# Patient Record
(demographics unavailable — no encounter records)

---

## 2024-11-07 NOTE — PHYSICAL EXAM
[Well Developed] : well developed [Well Nourished] : well nourished [No Acute Distress] : no acute distress [Normal Conjunctiva] : normal conjunctiva [Normal Venous Pressure] : normal venous pressure [No Carotid Bruit] : no carotid bruit [Normal S1, S2] : normal S1, S2 [No Murmur] : no murmur [No Rub] : no rub [No Gallop] : no gallop [Clear Lung Fields] : clear lung fields [Good Air Entry] : good air entry [No Respiratory Distress] : no respiratory distress  [Soft] : abdomen soft [Non Tender] : non-tender [No Masses/organomegaly] : no masses/organomegaly [Normal Bowel Sounds] : normal bowel sounds [Normal Gait] : normal gait [No Edema] : no edema [No Cyanosis] : no cyanosis [No Clubbing] : no clubbing [No Varicosities] : no varicosities [No Rash] : no rash [No Skin Lesions] : no skin lesions [Moves all extremities] : moves all extremities [No Focal Deficits] : no focal deficits [Normal Speech] : normal speech [Alert and Oriented] : alert and oriented [Normal memory] : normal memory [de-identified] : Regular rate and rhythm, NL S1, S2, non-displaced PMI, chest non-tender; no rubs,heaves  or gallops a  Grade 2/6 systolic murmur noted at the LSB

## 2024-11-07 NOTE — HISTORY OF PRESENT ILLNESS
[FreeTextEntry1] : ELVA JACOBSEN  is a 53 year old F w/ pmhx of HTN, Hiatal hernia, Depression on Truvada, Morbid Obesity, Asthma who presents today as a new patient and pre op clearance. The patient denies fever, chills, sore throat, loss of taste or smell, muscle aches weight loss, malaise, rash, recent travel, insect bites, alteration bowel habits, headaches, weakness, abdominal  pain, bloating, changes in urination, visual disturbances, shortness of breath, chest pain, dizziness, palpitations.  The patient here for evaluation of high blood pressure. Patient is currently tolerating the current antihypertensive regime and they deny headaches, stiff neck, visual changes, or PND. The patient has been trying to stay on a low-sodium diet.  I was asked to see this delightful patient today for Pre-op Evaluation  prior to  _laparoscopic sleeve gastrectomy____  with   Dr. Benítez____  at fax number   _______  .  The patient denies fever, cough, wheezing, sputum production, hemoptysis, dyspnea, congestion, diarrhea, constipation, nausea, vomiting, bright red blood per rectum, melena, angina, chest pain, palpitations, diaphoresis, PND, incontinence, frequency, urgency, dysuria, edema, joint pain, headache, weakness, numbness and dizziness. The date of the planned procedure is: ___TBD________

## 2024-11-07 NOTE — PHYSICAL EXAM
[Well Developed] : well developed [Well Nourished] : well nourished [No Acute Distress] : no acute distress [Normal Conjunctiva] : normal conjunctiva [Normal Venous Pressure] : normal venous pressure [No Carotid Bruit] : no carotid bruit [Normal S1, S2] : normal S1, S2 [No Murmur] : no murmur [No Rub] : no rub [No Gallop] : no gallop [Clear Lung Fields] : clear lung fields [Good Air Entry] : good air entry [No Respiratory Distress] : no respiratory distress  [Soft] : abdomen soft [Non Tender] : non-tender [No Masses/organomegaly] : no masses/organomegaly [Normal Bowel Sounds] : normal bowel sounds [Normal Gait] : normal gait [No Edema] : no edema [No Cyanosis] : no cyanosis [No Clubbing] : no clubbing [No Varicosities] : no varicosities [No Rash] : no rash [No Skin Lesions] : no skin lesions [Moves all extremities] : moves all extremities [No Focal Deficits] : no focal deficits [Normal Speech] : normal speech [Alert and Oriented] : alert and oriented [Normal memory] : normal memory [de-identified] : Regular rate and rhythm, NL S1, S2, non-displaced PMI, chest non-tender; no rubs,heaves  or gallops a  Grade 2/6 systolic murmur noted at the LSB

## 2024-11-14 NOTE — HISTORY OF PRESENT ILLNESS
[de-identified] : 53-year-old female with a longstanding history of morbid obesity is currently undergoing work-up for Laparoscopic Sleeve Gastrectomy. Weight stable. Patient is trying to make better food choices, eat 3 protein-rich meals/day, drink more water, and ambulate for exercise.

## 2024-11-14 NOTE — PHYSICAL EXAM
[Obese, well nourished, in no acute distress] : obese, well nourished, in no acute distress [Normal] : full range of motion and no deformities appreciated [de-identified] : Equal chest rise, non-labored respirations, no audible wheezing. [de-identified] : soft, NT, ND, no evidence of hernia or diastasis; well-healed scars from prior  and hysterectomy; obese

## 2024-11-14 NOTE — HISTORY OF PRESENT ILLNESS
[de-identified] : 53-year-old female with a longstanding history of morbid obesity is currently undergoing work-up for Laparoscopic Sleeve Gastrectomy. Weight stable. Patient is trying to make better food choices, eat 3 protein-rich meals/day, drink more water, and ambulate for exercise.

## 2024-11-14 NOTE — PHYSICAL EXAM
[Obese, well nourished, in no acute distress] : obese, well nourished, in no acute distress [Normal] : full range of motion and no deformities appreciated [de-identified] : Equal chest rise, non-labored respirations, no audible wheezing. [de-identified] : soft, NT, ND, no evidence of hernia or diastasis; well-healed scars from prior  and hysterectomy; obese

## 2024-11-14 NOTE — ASSESSMENT
[FreeTextEntry1] : 53-year-old female with a longstanding history of morbid obesity is currently undergoing work-up for Laparoscopic Sleeve Gastrectomy. The patient is doing well. Weight stable. Greater than 15 minutes was spent discussing the importance of dietary and lifestyle changes for weight loss as well as long-term weight maintenance.  Reviewed nutrition and exercise guidelines Protein focus diet and 3 meals/day Increase daily zero calorie liquid intake - goal 64oz/day Increase activities and keep track of steps - goal 8-10k steps/day Stress reduction modalities   Continue workup for Laparoscopic Sleeve Gastrectomy Continue multicomponent intervention visits  Labs pending Cardiology: undergoing workup Pulmonary: cleared by Dr Rajan 10/2/24. HST: mild desmond. Pt reports using CPAP. PFT: restricted, low volumes, dlco normal when corrected for volumes. CXR: clear lungs, no focal consolidation or pleural effusions, cardiac silhouette appears normal. No bony abnormality.  Endoscopy: EGD showed moderate hiatal hernia; non erosive gastritis, patulous LES, ring in the lower third of the esophagus; will order upper GI series. Negative H Pylori. No intestinal epithelium identified. Nutritionist Ingrid Aguirre evaluation completed 7/16/24 Dr. KONSTANTIN Weaver Psychologist evaluation - conditional candidate pending documentation from psychiatrist  LOS pending Diet history completed Pt follows with rheumatologist for arthritis - will need medication plan for post surgery (currently on diclofenac and prednisone)  All questions answered Additional time spent before and after visit reviewing chart.

## 2024-11-20 NOTE — PLAN
[TextEntry] : To follow-up with bariatric surgeon. She will see me 6 months after her bariatric surgery.

## 2024-11-20 NOTE — ASSESSMENT
[FreeTextEntry1] : Patient is being evaluated for sleeve gastrectomy.  An upper endoscopy revealed a hiatus hernia and patulous LES.  She also had a nonobstructing Schatzki's ring.  Pathology was negative for H. pylori and revealed mild chronic gastritis.  She will continue omeprazole. She apparently had an upper GI series this morning.  The results are pending.  She will follow-up with her bariatric surgeon.

## 2024-11-20 NOTE — HISTORY OF PRESENT ILLNESS
[FreeTextEntry1] : Patient is a 52-year-old female who is referred for an upper endoscopy prior to bariatric sleeve gastrectomy.  She does have a history of chronic heartburn.  She had an upper endoscopy in 2020 that revealed a hiatus hernia with erosive esophagitis.  She is taking omeprazole 40 mg daily with relief of her symptoms.  She also was found to have a smooth stricture at that time.  This did not require dilatation.  She has no complaints of dysphagia.  She does have heartburn and regurgitation if she does not take her omeprazole. Patient also had a colonoscopy in 2020 that was normal.  Her bowel movements are regular.  11/20/2024-patient had an upper endoscopy on 8/15/2024.  She did have a medium sized hiatus hernia and a patulous LES.  There was a Schatzki's ring in the lower esophagus at about 35 cm.  This was nonobstructing and the scope was easily passed.  The rest of the endoscopy was normal.  Pathology revealed mild chronic gastritis but H. pylori was negative.  Biopsies at the GE junction did not reveal any evidence of intestinal metaplasia or Good's esophagus.  Biopsies of the esophagus revealed mild chronic inflammation.  There is no evidence of eosinophilic esophagitis. Patient has started a proton pump inhibitor and symptomatically is doing much better.

## 2024-11-20 NOTE — REASON FOR VISIT
[Follow-up] : a follow-up of an existing diagnosis [FreeTextEntry1] : s/p EGD, For possible bariatric surgery

## 2025-01-29 NOTE — HISTORY OF PRESENT ILLNESS
[FreeTextEntry1] : This is a 53 year y/o female with a PMHx of HTN, Hiatal hernia, ROSA with CPAP, Depression on Truvada, Morbid Obesity, Asthma presents today for follow up.   BMI 44.59, pt is planning for laparoscopic sleeve gastrectomy  The patient is here for evaluation of high blood pressure. Currently tolerating antihypertensive medications. Denies headaches, stiff neck, visual changes, or PND. The patient has been trying to stay on a low-sodium diet.  s/p negative STR 11/2024 s/p TTE 11/2024 - normal EF, mild MR, mild TR.  I was asked to see this delightful patient today for Pre-op Evaluation  prior to  Laparoscopic Sleeve Gastrectomy_____  with   Dr. Kell Hassan .  The patient denies fever, cough, wheezing, sputum production, hemoptysis, dyspnea, congestion, diarrhea, constipation, nausea, vomiting, bright red blood per rectum, melena, angina, chest pain, palpitations, diaphoresis, PND, incontinence, frequency, urgency, dysuria, edema, joint pain, headache, weakness, numbness and dizziness. The date of the planned procedure is: ___TBD________  Patient expresses frustration on not being able to lose weight despite dietary attempts which she needs to complete her process of clearing for gastric bypass surgery.

## 2025-01-29 NOTE — HISTORY OF PRESENT ILLNESS
Do you know anything about this? Please advise   Thanks [FreeTextEntry1] : This is a 53 year y/o female with a PMHx of HTN, Hiatal hernia, ROSA with CPAP, Depression on Truvada, Morbid Obesity, Asthma presents today for follow up.   BMI 44.59, pt is planning for laparoscopic sleeve gastrectomy  The patient is here for evaluation of high blood pressure. Currently tolerating antihypertensive medications. Denies headaches, stiff neck, visual changes, or PND. The patient has been trying to stay on a low-sodium diet.  s/p negative STR 11/2024 s/p TTE 11/2024 - normal EF, mild MR, mild TR.  I was asked to see this delightful patient today for Pre-op Evaluation  prior to  Laparoscopic Sleeve Gastrectomy_____  with   Dr. Kell Hassan .  The patient denies fever, cough, wheezing, sputum production, hemoptysis, dyspnea, congestion, diarrhea, constipation, nausea, vomiting, bright red blood per rectum, melena, angina, chest pain, palpitations, diaphoresis, PND, incontinence, frequency, urgency, dysuria, edema, joint pain, headache, weakness, numbness and dizziness. The date of the planned procedure is: ___TBD________  Patient expresses frustration on not being able to lose weight despite dietary attempts which she needs to complete her process of clearing for gastric bypass surgery.

## 2025-01-29 NOTE — DISCUSSION/SUMMARY
[FreeTextEntry1] : My cumulative time spent on today's visit included: Preparation for the visit, review of the medical records, review of pertinent diagnostic studies, review and integration of laboratory data, coordination of care, examination and counseling of the patient on the above diagnosis, treatment plan and prognosis, orders of diagnostic tests and any additional lab data ordered today, medications and/or appropriate procedures and documentation in the medical records of today's visit. Patient advised and verbally acknowledged to make a follow up appt 3-4 weeks and to call the office immediately for follow up for any change in their condition. pt. that failure to comply with follow-up and/or notification of office for change in the condition can result in severe health consequences and worsening of her overall condition. My plan today was given to the patient in writing with all instructions discussed verbally above.

## 2025-03-12 NOTE — PROCEDURE
[FreeTextEntry1] : Reviewed:  HST: mild desmond   PFT: restricted, low volumes, dlco normal when corrected for volumes.  CXR: clear lungs, no focal consolidation or pleural effusions, cardiac silhouette appears normal.  No bony abnormality.

## 2025-03-12 NOTE — ASSESSMENT
[FreeTextEntry1] : We discussed cpap use and ways to get her more comfortable and increase her compliance she will try again to use cpap nightly and for more hours reassess in 3 mo  alternatively we can try to switch to mouth guard or perhaps zepbound an option to aid her weight loss?

## 2025-03-12 NOTE — HISTORY OF PRESENT ILLNESS
[TextBox_4] : trying to get used to cpap feels better using it but having trouble using it frequently enough wants to continue to try to use it but now insurance wont cover bc not "compliant enough"

## 2025-06-25 NOTE — HISTORY OF PRESENT ILLNESS
[TextBox_4] : using cpap, no complaints on zepbound now for a little over a month, lost about 15 lbs so far and feels good, on 7.5mg she will be traveling july 3 for 6 days and wont be able to take cpap with her

## 2025-06-25 NOTE — ASSESSMENT
[FreeTextEntry1] : cont weight loss with zepbound  cont cpap nightly, will have 6 days next mo with no useage bc of travel  fu 3 mo

## 2025-06-25 NOTE — PROCEDURE
[FreeTextEntry1] : Reviewed:  compliance  HST: mild desmond   PFT: restricted, low volumes, dlco normal when corrected for volumes.  CXR: clear lungs, no focal consolidation or pleural effusions, cardiac silhouette appears normal.  No bony abnormality.